# Patient Record
Sex: MALE | Race: WHITE | NOT HISPANIC OR LATINO | Employment: OTHER | URBAN - METROPOLITAN AREA
[De-identification: names, ages, dates, MRNs, and addresses within clinical notes are randomized per-mention and may not be internally consistent; named-entity substitution may affect disease eponyms.]

---

## 2022-12-16 ENCOUNTER — APPOINTMENT (OUTPATIENT)
Dept: GENERAL RADIOLOGY | Facility: HOSPITAL | Age: 47
End: 2022-12-16

## 2022-12-16 ENCOUNTER — HOSPITAL ENCOUNTER (EMERGENCY)
Facility: HOSPITAL | Age: 47
Discharge: HOME | End: 2022-12-16
Attending: EMERGENCY MEDICINE | Admitting: EMERGENCY MEDICINE

## 2022-12-16 VITALS
BODY MASS INDEX: 33.01 KG/M2 | WEIGHT: 217.81 LBS | RESPIRATION RATE: 17 BRPM | DIASTOLIC BLOOD PRESSURE: 82 MMHG | HEART RATE: 50 BPM | SYSTOLIC BLOOD PRESSURE: 148 MMHG | OXYGEN SATURATION: 97 % | HEIGHT: 68 IN | TEMPERATURE: 98.2 F

## 2022-12-16 DIAGNOSIS — R07.9 CHEST PAIN, UNSPECIFIED TYPE: Primary | ICD-10-CM

## 2022-12-16 LAB
ALBUMIN SERPL-MCNC: 4.4 G/DL (ref 3.5–5.2)
ALBUMIN/GLOB SERPL: 1.7 G/DL
ALP SERPL-CCNC: 90 U/L (ref 39–117)
ALT SERPL W P-5'-P-CCNC: 15 U/L (ref 1–41)
ANION GAP SERPL CALCULATED.3IONS-SCNC: 7.3 MMOL/L (ref 5–15)
AST SERPL-CCNC: 15 U/L (ref 1–40)
BASOPHILS # BLD AUTO: 0.03 10*3/MM3 (ref 0–0.2)
BASOPHILS NFR BLD AUTO: 0.7 % (ref 0–1.5)
BILIRUB SERPL-MCNC: 0.9 MG/DL (ref 0–1.2)
BUN SERPL-MCNC: 8 MG/DL (ref 6–20)
BUN/CREAT SERPL: 11 (ref 7–25)
CALCIUM SPEC-SCNC: 9.5 MG/DL (ref 8.6–10.5)
CHLORIDE SERPL-SCNC: 104 MMOL/L (ref 98–107)
CO2 SERPL-SCNC: 25.7 MMOL/L (ref 22–29)
CREAT SERPL-MCNC: 0.73 MG/DL (ref 0.76–1.27)
DEPRECATED RDW RBC AUTO: 39.3 FL (ref 37–54)
EGFRCR SERPLBLD CKD-EPI 2021: 112.2 ML/MIN/1.73
EOSINOPHIL # BLD AUTO: 0.09 10*3/MM3 (ref 0–0.4)
EOSINOPHIL NFR BLD AUTO: 2.1 % (ref 0.3–6.2)
ERYTHROCYTE [DISTWIDTH] IN BLOOD BY AUTOMATED COUNT: 12.1 % (ref 12.3–15.4)
GLOBULIN UR ELPH-MCNC: 2.6 GM/DL
GLUCOSE SERPL-MCNC: 103 MG/DL (ref 65–99)
HCT VFR BLD AUTO: 39.9 % (ref 37.5–51)
HGB BLD-MCNC: 13.7 G/DL (ref 13–17.7)
HOLD SPECIMEN: NORMAL
IMM GRANULOCYTES # BLD AUTO: 0.01 10*3/MM3 (ref 0–0.05)
IMM GRANULOCYTES NFR BLD AUTO: 0.2 % (ref 0–0.5)
LIPASE SERPL-CCNC: 17 U/L (ref 13–60)
LYMPHOCYTES # BLD AUTO: 1.39 10*3/MM3 (ref 0.7–3.1)
LYMPHOCYTES NFR BLD AUTO: 33 % (ref 19.6–45.3)
MAGNESIUM SERPL-MCNC: 2 MG/DL (ref 1.6–2.6)
MCH RBC QN AUTO: 30.4 PG (ref 26.6–33)
MCHC RBC AUTO-ENTMCNC: 34.3 G/DL (ref 31.5–35.7)
MCV RBC AUTO: 88.7 FL (ref 79–97)
MONOCYTES # BLD AUTO: 0.34 10*3/MM3 (ref 0.1–0.9)
MONOCYTES NFR BLD AUTO: 8.1 % (ref 5–12)
NEUTROPHILS NFR BLD AUTO: 2.35 10*3/MM3 (ref 1.7–7)
NEUTROPHILS NFR BLD AUTO: 55.9 % (ref 42.7–76)
NRBC BLD AUTO-RTO: 0 /100 WBC (ref 0–0.2)
NT-PROBNP SERPL-MCNC: 43.6 PG/ML (ref 0–450)
PLATELET # BLD AUTO: 308 10*3/MM3 (ref 140–450)
PMV BLD AUTO: 8.3 FL (ref 6–12)
POTASSIUM SERPL-SCNC: 3.5 MMOL/L (ref 3.5–5.2)
PROT SERPL-MCNC: 7 G/DL (ref 6–8.5)
RBC # BLD AUTO: 4.5 10*6/MM3 (ref 4.14–5.8)
SODIUM SERPL-SCNC: 137 MMOL/L (ref 136–145)
TROPONIN I SERPL-MCNC: 0 NG/ML (ref 0–0.08)
TROPONIN I SERPL-MCNC: 0.01 NG/ML (ref 0–0.08)
WBC NRBC COR # BLD: 4.21 10*3/MM3 (ref 3.4–10.8)
WHOLE BLOOD HOLD COAG: NORMAL
WHOLE BLOOD HOLD SPECIMEN: NORMAL

## 2022-12-16 PROCEDURE — 93010 ELECTROCARDIOGRAM REPORT: CPT | Performed by: INTERNAL MEDICINE

## 2022-12-16 PROCEDURE — 99284 EMERGENCY DEPT VISIT MOD MDM: CPT

## 2022-12-16 PROCEDURE — 83880 ASSAY OF NATRIURETIC PEPTIDE: CPT

## 2022-12-16 PROCEDURE — 93005 ELECTROCARDIOGRAM TRACING: CPT

## 2022-12-16 PROCEDURE — 83735 ASSAY OF MAGNESIUM: CPT | Performed by: EMERGENCY MEDICINE

## 2022-12-16 PROCEDURE — 84484 ASSAY OF TROPONIN QUANT: CPT

## 2022-12-16 PROCEDURE — 36415 COLL VENOUS BLD VENIPUNCTURE: CPT | Performed by: EMERGENCY MEDICINE

## 2022-12-16 PROCEDURE — 93005 ELECTROCARDIOGRAM TRACING: CPT | Performed by: EMERGENCY MEDICINE

## 2022-12-16 PROCEDURE — 85025 COMPLETE CBC W/AUTO DIFF WBC: CPT | Performed by: EMERGENCY MEDICINE

## 2022-12-16 PROCEDURE — 83690 ASSAY OF LIPASE: CPT | Performed by: EMERGENCY MEDICINE

## 2022-12-16 PROCEDURE — 71045 X-RAY EXAM CHEST 1 VIEW: CPT

## 2022-12-16 PROCEDURE — 80053 COMPREHEN METABOLIC PANEL: CPT | Performed by: EMERGENCY MEDICINE

## 2022-12-16 RX ORDER — IBUPROFEN 400 MG/1
800 TABLET ORAL ONCE
Status: COMPLETED | OUTPATIENT
Start: 2022-12-16 | End: 2022-12-16

## 2022-12-16 RX ORDER — ASPIRIN 81 MG/1
324 TABLET, CHEWABLE ORAL ONCE
Status: COMPLETED | OUTPATIENT
Start: 2022-12-16 | End: 2022-12-16

## 2022-12-16 RX ORDER — SODIUM CHLORIDE 0.9 % (FLUSH) 0.9 %
10 SYRINGE (ML) INJECTION AS NEEDED
Status: DISCONTINUED | OUTPATIENT
Start: 2022-12-16 | End: 2022-12-16 | Stop reason: HOSPADM

## 2022-12-16 RX ADMIN — IBUPROFEN 800 MG: 400 TABLET ORAL at 18:40

## 2022-12-16 RX ADMIN — ASPIRIN 324 MG: 81 TABLET, CHEWABLE ORAL at 16:44

## 2022-12-16 NOTE — DISCHARGE INSTRUCTIONS
Recommend follow-up with your primary care provider or Dr. Godwin Bauer, cardiology, to arrange an outpatient cardiac stress test.

## 2022-12-16 NOTE — ED PROVIDER NOTES
"Time: 3:42 PM EST  Chief Complaint:   Chief Complaint   Patient presents with   • Chest Pain           History of Present Illness:  Patient is a 47 y.o. year old male who presents to the emergency department with chest pain. Located left lateral. Waxing and waning. Worse with movement. Started 2 days ago after a certain movement. Described as \"tightness and very uncomfortable\". Father passed at age 38 from MI.           HPI        Patient Care Team  Primary Care Provider: Sedrick Dai MD    Past Medical History:     Allergies   Allergen Reactions   • Jtjka-Bxhva-Fnozxpl-Pramoxine Other (See Comments)     EAR PAIN     Past Medical History:   Diagnosis Date   • Anal cancer (HCC)    • Cancer (HCC)    • Sleep apnea      Past Surgical History:   Procedure Laterality Date   • HEMORRHOIDECTOMY       Family History   Problem Relation Age of Onset   • Heart attack Father        Home Medications:  Prior to Admission medications    Not on File        Social History:   Social History     Tobacco Use   • Smoking status: Never   • Smokeless tobacco: Never   Vaping Use   • Vaping Use: Never used   Substance Use Topics   • Alcohol use: Never   • Drug use: Never         Review of Systems:  Review of Systems   Constitutional: Negative.  Negative for fever.   HENT: Negative.    Eyes: Negative.    Respiratory: Negative.    Cardiovascular: Positive for chest pain.   Gastrointestinal: Negative.    Endocrine: Negative.    Genitourinary: Negative.    Musculoskeletal: Negative.    Skin: Negative.    Allergic/Immunologic: Negative.    Neurological: Negative.    Hematological: Negative.    Psychiatric/Behavioral: Negative.         Physical Exam:  /82   Pulse 53   Temp 98.2 °F (36.8 °C) (Oral)   Resp 17   Ht 172.7 cm (68\")   Wt 98.8 kg (217 lb 13 oz)   SpO2 97%   BMI 33.12 kg/m²     Physical Exam  Vitals and nursing note reviewed.   Constitutional:       Appearance: Normal appearance.   HENT:      Head: Normocephalic. "   Eyes:      Extraocular Movements: Extraocular movements intact.      Conjunctiva/sclera: Conjunctivae normal.   Cardiovascular:      Rate and Rhythm: Normal rate and regular rhythm.      Heart sounds: Normal heart sounds.   Pulmonary:      Effort: Pulmonary effort is normal.      Breath sounds: Normal breath sounds.   Chest:      Chest wall: No tenderness.   Abdominal:      General: Bowel sounds are normal. There is no distension.      Palpations: Abdomen is soft.   Musculoskeletal:         General: Normal range of motion.      Cervical back: Normal range of motion and neck supple.   Skin:     General: Skin is warm and dry.      Coloration: Skin is not cyanotic.   Neurological:      Mental Status: He is alert and oriented to person, place, and time.   Psychiatric:         Attention and Perception: Attention and perception normal.         Mood and Affect: Mood normal.                Medications in the Emergency Department:  Medications   sodium chloride 0.9 % flush 10 mL (has no administration in time range)   aspirin chewable tablet 324 mg (324 mg Oral Given 12/16/22 1644)   ibuprofen (ADVIL,MOTRIN) tablet 800 mg (800 mg Oral Given 12/16/22 1840)        Labs  Lab Results (last 24 hours)     Procedure Component Value Units Date/Time    POC Troponin I with Hold Tube [[id number]] Collected: 12/16/22 1601    Specimen: Blood Updated: 12/16/22 1708    Narrative:      The following orders were created for panel order POC Troponin I with Hold Tube.  Procedure                               Abnormality         Status                     ---------                               -----------         ------                     POC Troponin I[[id number]]                                                              HOLD Troponin-I Tube[[id number]]                             Final result                 Please view results for these tests on the individual orders.    CBC & Differential [[id number]]  (Abnormal) Collected: 12/16/22 1601     Specimen: Blood from Arm, Right Updated: 12/16/22 1615    Narrative:      The following orders were created for panel order CBC & Differential.  Procedure                               Abnormality         Status                     ---------                               -----------         ------                     CBC Auto Differential[[id number]]        Abnormal            Final result                 Please view results for these tests on the individual orders.    Comprehensive Metabolic Panel [[id number]]  (Abnormal) Collected: 12/16/22 1601    Specimen: Blood from Arm, Right Updated: 12/16/22 1639     Glucose 103 mg/dL      BUN 8 mg/dL      Creatinine 0.73 mg/dL      Sodium 137 mmol/L      Potassium 3.5 mmol/L      Chloride 104 mmol/L      CO2 25.7 mmol/L      Calcium 9.5 mg/dL      Total Protein 7.0 g/dL      Albumin 4.40 g/dL      ALT (SGPT) 15 U/L      AST (SGOT) 15 U/L      Alkaline Phosphatase 90 U/L      Total Bilirubin 0.9 mg/dL      Globulin 2.6 gm/dL      A/G Ratio 1.7 g/dL      BUN/Creatinine Ratio 11.0     Anion Gap 7.3 mmol/L      eGFR 112.2 mL/min/1.73      Comment: National Kidney Foundation and American Society of Nephrology (ASN) Task Force recommended calculation based on the Chronic Kidney Disease Epidemiology Collaboration (CKD-EPI) equation refit without adjustment for race.       Narrative:      GFR Normal >60  Chronic Kidney Disease <60  Kidney Failure <15      Lipase [[id number]]  (Normal) Collected: 12/16/22 1601    Specimen: Blood from Arm, Right Updated: 12/16/22 1639     Lipase 17 U/L     BNP [[id number]]  (Normal) Collected: 12/16/22 1601    Specimen: Blood from Arm, Right Updated: 12/16/22 1643     proBNP 43.6 pg/mL     Narrative:      Among patients with dyspnea, NT-proBNP is highly sensitive for the detection of acute congestive heart failure. In addition NT-proBNP of <300 pg/ml effectively rules out acute congestive heart failure with 99% negative predictive value.       Magnesium [[id number]]  (Normal) Collected: 12/16/22 1601    Specimen: Blood from Arm, Right Updated: 12/16/22 1639     Magnesium 2.0 mg/dL     CBC Auto Differential [[id number]]  (Abnormal) Collected: 12/16/22 1601    Specimen: Blood from Arm, Right Updated: 12/16/22 1615     WBC 4.21 10*3/mm3      RBC 4.50 10*6/mm3      Hemoglobin 13.7 g/dL      Hematocrit 39.9 %      MCV 88.7 fL      MCH 30.4 pg      MCHC 34.3 g/dL      RDW 12.1 %      RDW-SD 39.3 fl      MPV 8.3 fL      Platelets 308 10*3/mm3      Neutrophil % 55.9 %      Lymphocyte % 33.0 %      Monocyte % 8.1 %      Eosinophil % 2.1 %      Basophil % 0.7 %      Immature Grans % 0.2 %      Neutrophils, Absolute 2.35 10*3/mm3      Lymphocytes, Absolute 1.39 10*3/mm3      Monocytes, Absolute 0.34 10*3/mm3      Eosinophils, Absolute 0.09 10*3/mm3      Basophils, Absolute 0.03 10*3/mm3      Immature Grans, Absolute 0.01 10*3/mm3      nRBC 0.0 /100 WBC     POC Troponin I [[id number]]  (Normal) Collected: 12/16/22 1604    Specimen: Blood Updated: 12/16/22 1617     Troponin I 0.00 ng/mL      Comment: Serial Number: [id number]:  [id number]       POC Troponin I with Hold Tube [[id number]] Collected: 12/16/22 1805    Specimen: Blood Updated: 12/16/22 1821    Narrative:      The following orders were created for panel order POC Troponin I with Hold Tube.  Procedure                               Abnormality         Status                     ---------                               -----------         ------                     POC Troponin I[[id number]]                                                              HOLD Troponin-I Tube[[id number]]                             Final result                 Please view results for these tests on the individual orders.    POC Troponin I [[id number]]  (Normal) Collected: 12/16/22 1808    Specimen: Blood Updated: 12/16/22 1821     Troponin I 0.01 ng/mL      Comment: Serial Number: [id number]:  [id number]              Imaging:  XR Chest 1  View    Result Date: 12/16/2022  PROCEDURE: XR CHEST 1 VW  COMPARISON: [facility], CR, CHEST AP/PA 1 VIEW, 1/01/2011, 1:16.  INDICATIONS: CHEST PAIN TODAY  FINDINGS:   The lungs are well-expanded. The heart and pulmonary vasculature are within normal limits. No pleural effusions are identified. There are no active appearing infiltrates.  No evidence of pneumothorax.  IMPRESSION: No active disease.  VALENTINO AMARAL MD       Electronically Signed and Approved By: VALENTINO AMARAL MD on 12/16/2022 at 16:56               Procedures:  Procedures    Progress  ED Course as of 12/16/22 1902   Fri Dec 16, 2022   1551 EKG:    Rhythm: Sinus bradycardia  Rate: 54  Intervals: Normal  T-wave: Normal  ST Segment: Normal    EKG Comparison: Not available    Interpreted by me   [NL]      ED Course User Index  [NL] John Veras DO                            The patient was initially evaluated in the triage area where orders were placed. The patient was later dispositioned by John Veras DO.      The patient was advised to stay for completion of workup which includes but is not limited to communication of labs and radiological results, reassessment and plan. The patient was advised that leaving prior to disposition by a provider could result in critical findings that are not communicated to the patient.     Medical Decision Making:  MDM         The following orders were placed after triage and evaluation:  Orders Placed This Encounter   Procedures   • XR Chest 1 View   • [place] Draw   • Comprehensive Metabolic Panel   • Lipase   • BNP   • Magnesium   • CBC Auto Differential   • NPO Diet NPO Type: Strict NPO   • Undress & Gown   • Cardiac Monitoring   • Continuous Pulse Oximetry   • Oxygen Therapy- Nasal Cannula; 2 LPM; Titrate for SPO2: equal to or greater than, 92%   • POC Troponin I   • POC Troponin I   • POC Troponin I   • POC Troponin I   • ECG 12 Lead ED Triage Standing Order; Chest Pain   • ECG 12 Lead ED Triage  Standing Order; Chest Pain   • Insert Peripheral IV   • POC Troponin I with Hold Tube   • CBC & Differential   • Green Top (Gel)   • Lavender Top   • Gold Top - SST   • Light Blue Top   • HOLD Troponin-I Tube   • HOLD Troponin-I Tube     HEART SCORE  History: Slightly Suspicious (0)  ECG: Normal (0)  Age: 45-64 years old (1)  Risk factors: No Risk Factors (0)  Troponin: normal (0)    This patient's HEART score is 1.    According to the HEART Score Study: Score (Risk of adverse cardiac event in the next 14 days)  Scores 0-3: (0.9-1.7%) In the HEART Score study, these patients were discharged home.  Scores 4-6: (12-16.6%)  In the HEART Score study, these patients were admitted to the hospital.  Scores ?7: (50-65%) In the HEART Score study, these patients were candidates for early invasive measures.   Final disposition is based on individual provider judgement and current clinical situation.    Patient's cardiac work-up is negative with 2 sets of normal enzymes.  His chest x-ray is clear and EKGs are unremarkable.  Patient's pain most likely is musculoskeletal in nature.  I have, however, advised the patient to follow-up with his PCP or cardiology to obtain an outpatient cardiac stress test.        Final diagnoses:   Chest pain, unspecified type          Disposition:  ED Disposition     ED Disposition   Discharge    Condition   Stable    Comment   --             This medical record created using voice recognition software.           John Veras DO  12/16/22 3012

## 2022-12-17 LAB
QT INTERVAL: 402 MS
QT INTERVAL: 408 MS

## 2024-03-03 ENCOUNTER — HOSPITAL ENCOUNTER (EMERGENCY)
Facility: HOSPITAL | Age: 49
Discharge: HOME | End: 2024-03-03
Attending: EMERGENCY MEDICINE | Admitting: EMERGENCY MEDICINE
Payer: COMMERCIAL

## 2024-03-03 ENCOUNTER — APPOINTMENT (OUTPATIENT)
Dept: GENERAL RADIOLOGY | Facility: HOSPITAL | Age: 49
End: 2024-03-03
Payer: COMMERCIAL

## 2024-03-03 VITALS
WEIGHT: 230.6 LBS | BODY MASS INDEX: 34.95 KG/M2 | RESPIRATION RATE: 18 BRPM | TEMPERATURE: 98.2 F | HEIGHT: 68 IN | SYSTOLIC BLOOD PRESSURE: 130 MMHG | HEART RATE: 67 BPM | DIASTOLIC BLOOD PRESSURE: 71 MMHG | OXYGEN SATURATION: 95 %

## 2024-03-03 DIAGNOSIS — M54.41 ACUTE RIGHT-SIDED LOW BACK PAIN WITH RIGHT-SIDED SCIATICA: Primary | ICD-10-CM

## 2024-03-03 DIAGNOSIS — M19.011 ARTHROPATHY OF RIGHT SHOULDER: ICD-10-CM

## 2024-03-03 PROCEDURE — 73030 X-RAY EXAM OF SHOULDER: CPT

## 2024-03-03 PROCEDURE — 96372 THER/PROPH/DIAG INJ SC/IM: CPT

## 2024-03-03 PROCEDURE — 99282 EMERGENCY DEPT VISIT SF MDM: CPT

## 2024-03-03 PROCEDURE — 25010000002 KETOROLAC TROMETHAMINE PER 15 MG: Performed by: NURSE PRACTITIONER

## 2024-03-03 PROCEDURE — 72100 X-RAY EXAM L-S SPINE 2/3 VWS: CPT

## 2024-03-03 RX ORDER — LIDOCAINE 50 MG/G
1 PATCH TOPICAL EVERY 24 HOURS
Qty: 5 PATCH | Refills: 0 | Status: SHIPPED | OUTPATIENT
Start: 2024-03-03

## 2024-03-03 RX ORDER — KETOROLAC TROMETHAMINE 30 MG/ML
60 INJECTION, SOLUTION INTRAMUSCULAR; INTRAVENOUS ONCE
Status: COMPLETED | OUTPATIENT
Start: 2024-03-03 | End: 2024-03-03

## 2024-03-03 RX ORDER — METHYLPREDNISOLONE 4 MG/1
TABLET ORAL
Qty: 21 TABLET | Refills: 0 | Status: SHIPPED | OUTPATIENT
Start: 2024-03-03

## 2024-03-03 RX ORDER — IBUPROFEN 800 MG/1
800 TABLET ORAL 3 TIMES DAILY PRN
Qty: 20 TABLET | Refills: 0 | Status: SHIPPED | OUTPATIENT
Start: 2024-03-03

## 2024-03-03 RX ORDER — CYCLOBENZAPRINE HCL 10 MG
10 TABLET ORAL 3 TIMES DAILY PRN
Qty: 12 TABLET | Refills: 0 | Status: SHIPPED | OUTPATIENT
Start: 2024-03-03

## 2024-03-03 RX ADMIN — KETOROLAC TROMETHAMINE 60 MG: 60 INJECTION, SOLUTION INTRAMUSCULAR at 20:34

## 2024-03-03 NOTE — Clinical Note
[facility] EMERGENCY ROOM  [address] [place] [address]  Phone: [phone]  Fax: [phone]    Rinku Laughlin was seen and treated in our emergency department on 3/3/2024.  He may return to work on 03/05/2024.         Thank you for choosing [facility].    Ileana Brenner APRN

## 2024-03-04 NOTE — ED PROVIDER NOTES
Time: 7:04 PM EST  Date of encounter:  3/3/2024  Independent Historian/Clinical History and Information was obtained by:   Patient    History is limited by: N/A    Chief Complaint: Shoulder and back pain      History of Present Illness:  Patient is a 48 y.o. year old male who presents to the emergency department for evaluation of shoulder and back pain.  Patient states about 4 months ago he started having shoulder pain with no known definitive cause.  He had been pushing and pulling a lot of stuff and thought that was just it.  That pain has been on and off but over the last few days has seemed to got worse with no new injury.  In the past month patient noticed that his low back was hurting now in the last few days it has settled into the right side and is shooting down into his right leg to about his calf area.  Aching and throbbing with a shooting intermittent pain.  No numbness tingling or weakness.  No loss of bowel or bladder function.  No dysuria.  No known injury or cause.     HPI    Patient Care Team  Primary Care Provider: Sedrick Dai MD    Past Medical History:     Allergies   Allergen Reactions    Hkxwf-Vjyxs-Wrcyclv-Pramoxine Other (See Comments)     EAR PAIN     Past Medical History:   Diagnosis Date    Anal cancer     Cancer     Sleep apnea      Past Surgical History:   Procedure Laterality Date    HEMORRHOIDECTOMY       Family History   Problem Relation Age of Onset    Heart attack Father        Home Medications:  Prior to Admission medications    Not on File        Social History:   Social History     Tobacco Use    Smoking status: Never    Smokeless tobacco: Never   Vaping Use    Vaping status: Never Used   Substance Use Topics    Alcohol use: Never    Drug use: Never         Review of Systems:  Review of Systems   Constitutional:  Negative for fever.   Respiratory:  Negative for shortness of breath.    Cardiovascular:  Negative for chest pain and leg swelling.   Gastrointestinal:  Negative  "for abdominal pain.   Genitourinary:  Negative for dysuria and flank pain.   Musculoskeletal:  Positive for arthralgias (Right shoulder) and back pain.   Skin:  Negative for color change.   Neurological:  Negative for weakness and numbness.   Hematological: Negative.    Psychiatric/Behavioral: Negative.     All other systems reviewed and are negative.       Physical Exam:  /71 (BP Location: Left arm, Patient Position: Sitting)   Pulse 67   Temp 98.2 °F (36.8 °C) (Oral)   Resp 18   Ht 172.7 cm (68\")   Wt 105 kg (230 lb 9.6 oz)   SpO2 95%   BMI 35.06 kg/m²     Physical Exam  Vitals and nursing note reviewed.   Constitutional:       Appearance: Normal appearance.   HENT:      Head: Atraumatic.   Cardiovascular:      Rate and Rhythm: Normal rate and regular rhythm.      Pulses: Normal pulses.      Heart sounds: Normal heart sounds.   Pulmonary:      Effort: Pulmonary effort is normal.      Breath sounds: Normal breath sounds.   Chest:      Chest wall: No tenderness.   Abdominal:      General: Bowel sounds are normal.      Palpations: Abdomen is soft.      Tenderness: There is no right CVA tenderness or left CVA tenderness.   Musculoskeletal:         General: Tenderness (Tenderness in anterior shoulder with external rotation and abduction) present.      Cervical back: Normal range of motion. No tenderness.      Comments: Tenderness right lower lumbar back    Full range of motion of back and shoulder but complains of pain at a certain point   Skin:     General: Skin is warm and dry.      Capillary Refill: Capillary refill takes less than 2 seconds.   Neurological:      General: No focal deficit present.      Mental Status: He is alert.      Sensory: No sensory deficit.      Motor: No weakness.   Psychiatric:         Mood and Affect: Mood normal.         Behavior: Behavior normal.            Medical Decision Making:      Comorbidities that affect care:    Cancer    External Notes reviewed:    Previous ED Note: " Since last visit was to the ED on December 16, 2022 for chest pain      The following orders were placed and all results were independently analyzed by me:  Orders Placed This Encounter   Procedures    XR Shoulder 2+ View Right    XR Spine Lumbar AP & Lateral       Medications Given in the Emergency Department:  Medications   ketorolac (TORADOL) injection 60 mg (60 mg Intramuscular Given 3/3/24 2034)        ED Course:    ED Course as of 03/03/24 2137   Sun Mar 03, 2024   2028 XR Shoulder 2+ View Right  glenohumeral arthropathy [DS]   2028 XR Spine Lumbar AP & Lateral  Low lumbar degenerative changes [DS]      ED Course User Index  [DS] Ileana Brenner APRN       Labs:    Lab Results (last 24 hours)       ** No results found for the last 24 hours. **             Imaging:    XR Shoulder 2+ View Right    Result Date: 3/3/2024  PROCEDURE: XR SHOULDER 2+ VW RIGHT  COMPARISON: None  INDICATIONS: NO KNOWN INJURY COMPLAINS OF RIGHT SHOULDER PAIN  FINDINGS:  No fracture or dislocation.  No bony erosion or destruction.  No AC joint separation.  There is mild glenohumeral arthropathy.       Mild glenohumeral arthropathy.  No acute findings.      SIRIA WELCH MD       Electronically Signed and Approved By: SIRIA WELCH MD on 3/03/2024 at 20:22             XR Spine Lumbar AP & Lateral    Result Date: 3/3/2024  PROCEDURE: XR SPINE LUMBAR AP AND LATERAL  COMPARISON: None  INDICATIONS: NO KNOWN INJURY COMPLAINS OF LOWER BACK PAIN  FINDINGS:  No fracture or subluxation is seen.  There is mild disc space narrowing at L5-S1.  There is some endplate spurring at L4-5 and L5-S1 as well, and there is lower lumbar facet arthropathy.       Mild lower lumbar degenerative disease.  No fracture or malalignment.      SIRIA WELCH MD       Electronically Signed and Approved By: SIRIA WELCH MD on 3/03/2024 at 20:21                Differential Diagnosis and Discussion:    Back Pain: The patient presents with back pain. My differential  diagnosis includes but is not limited to acute spinal epidural abscess, acute spinal epidural bleed, cauda equina syndrome, abdominal aortic aneurysm, aortic dissection, kidney stone, pyelonephritis, musculoskeletal back pain, spinal fracture, and osteoarthritis.   Extremity Pain: Differential diagnosis includes but is not limited to soft tissue sprain, tendonitis, tendon injury, dislocation, fracture, deep vein thrombosis, arterial insufficiency, osteoarthritis, bursitis, and ligamentous damage.    All X-rays impressions were independently interpreted by me.    MDM  Number of Diagnoses or Management Options  Acute right-sided low back pain with right-sided sciatica  Arthropathy of right shoulder  Diagnosis management comments: The patient´s symptoms are consistent with musculoskeletal back pain. The patient is now resting comfortably, feels better, is alert, talkative, interactive and in no distress. The repeat examination is unremarkable and benign. The patient is neurologically intact and is ambulatory in the ED. The patient has no fever, no bowel or bladder incontinence, no saddle anesthesia, and is otherwise alert and well appearing. The history, physical exam, and diagnostics (if any) do not suggest the presence of acute spinal epidural abscess, acute spinal epidural bleed, cauda equina syndrome, abdominal aortic aneurysm, aortic dissection or other process requiring further testing, treatment or consultation in the emergency department. The vital signs have been stable. The patient's condition is stable and appropriate for discharge. The patient will pursue further outpatient evaluation with the primary care physician or other designated for consulting position as indicated in the discharge instructions.       Amount and/or Complexity of Data Reviewed  Tests in the radiology section of CPT®: reviewed and ordered  Tests in the medicine section of CPT®: ordered and reviewed    Risk of Complications, Morbidity,  and/or Mortality  Presenting problems: low  Diagnostic procedures: low  Management options: low    Patient Progress  Patient progress: stable         Patient Care Considerations:    NARCOTICS: I considered prescribing opiate pain medication as an outpatient, however no acute bony abnormality noted      Consultants/Shared Management Plan:    None    Social Determinants of Health:    Patient is independent, reliable, and has access to care.       Disposition and Care Coordination:    Discharged: The patient is suitable and stable for discharge with no need for consideration of admission.    I have explained the patient´s condition, diagnoses and treatment plan based on the information available to me at this time. I have answered questions and addressed any concerns. The patient has a good  understanding of the patient´s diagnosis, condition, and treatment plan as can be expected at this point. The vital signs have been stable. The patient´s condition is stable and appropriate for discharge from the emergency department.      The patient will pursue further outpatient evaluation with the primary care physician or other designated or consulting physician as outlined in the discharge instructions. They are agreeable to this plan of care and follow-up instructions have been explained in detail. The patient has received these instructions in written format and has expressed an understanding of the discharge instructions. The patient is aware that any significant change in condition or worsening of symptoms should prompt an immediate return to this or the closest emergency department or call to 911.  I have explained discharge medications and the need for follow up with the patient/caretakers. This was also printed in the discharge instructions. Patient was discharged with the following medications and follow up:      Medication List        New Prescriptions      cyclobenzaprine 10 MG tablet  Commonly known as:  FLEXERIL  Take 1 tablet by mouth 3 (Three) Times a Day As Needed for Muscle Spasms.     ibuprofen 800 MG tablet  Commonly known as: ADVIL,MOTRIN  Take 1 tablet by mouth 3 (Three) Times a Day As Needed for Mild Pain.     lidocaine 5 %  Commonly known as: LIDODERM  Place 1 patch on the skin as directed by provider Daily. Remove after 12 hours.     methylPREDNISolone 4 MG dose pack  Commonly known as: MEDROL  Take as directed on package instructions.               Where to Get Your Medications        These medications were sent to [facility] [address] [place] [address] [phone]  - [phone] [address] [address]      Phone: [phone]   cyclobenzaprine 10 MG tablet  ibuprofen 800 MG tablet  lidocaine 5 %  methylPREDNISolone 4 MG dose pack      Sedrick Dai MD  [address] [place] [address] [phone]      As needed       Final diagnoses:   Acute right-sided low back pain with right-sided sciatica   Arthropathy of right shoulder        ED Disposition       ED Disposition   Discharge    Condition   Stable    Comment   --               This medical record created using voice recognition software.             Ileana Brenner, APRN  03/03/24 4527

## 2024-03-04 NOTE — DISCHARGE INSTRUCTIONS
Imaging shows no acute abnormality other than some arthritis in your shoulder and your low back.    Rest.  Alternate ice and moist heat to affected areas.    Take medications as prescribed.    Follow-up with your PCP for persistent symptoms and any additional testing or treatment that may be needed.

## 2024-06-25 ENCOUNTER — HOSPITAL ENCOUNTER (EMERGENCY)
Facility: HOSPITAL | Age: 49
Discharge: HOME | End: 2024-06-25
Attending: EMERGENCY MEDICINE | Admitting: EMERGENCY MEDICINE
Payer: COMMERCIAL

## 2024-06-25 VITALS
TEMPERATURE: 98.1 F | HEART RATE: 59 BPM | WEIGHT: 227.07 LBS | HEIGHT: 68 IN | RESPIRATION RATE: 16 BRPM | DIASTOLIC BLOOD PRESSURE: 74 MMHG | OXYGEN SATURATION: 97 % | BODY MASS INDEX: 34.41 KG/M2 | SYSTOLIC BLOOD PRESSURE: 151 MMHG

## 2024-06-25 DIAGNOSIS — M54.41 ACUTE RIGHT-SIDED LOW BACK PAIN WITH RIGHT-SIDED SCIATICA: Primary | ICD-10-CM

## 2024-06-25 PROCEDURE — 99282 EMERGENCY DEPT VISIT SF MDM: CPT

## 2024-06-25 PROCEDURE — 97110 THERAPEUTIC EXERCISES: CPT | Performed by: PHYSICAL THERAPIST

## 2024-06-25 PROCEDURE — 25010000002 KETOROLAC TROMETHAMINE PER 15 MG

## 2024-06-25 PROCEDURE — 96372 THER/PROPH/DIAG INJ SC/IM: CPT

## 2024-06-25 PROCEDURE — 97161 PT EVAL LOW COMPLEX 20 MIN: CPT | Performed by: PHYSICAL THERAPIST

## 2024-06-25 RX ORDER — LIDOCAINE 50 MG/G
1 PATCH TOPICAL EVERY 24 HOURS
Qty: 7 PATCH | Refills: 0 | Status: SHIPPED | OUTPATIENT
Start: 2024-06-25

## 2024-06-25 RX ORDER — METHYLPREDNISOLONE 4 MG/1
TABLET ORAL
Qty: 21 TABLET | Refills: 0 | Status: SHIPPED | OUTPATIENT
Start: 2024-06-25 | End: 2024-06-30

## 2024-06-25 RX ORDER — KETOROLAC TROMETHAMINE 30 MG/ML
30 INJECTION, SOLUTION INTRAMUSCULAR; INTRAVENOUS ONCE
Status: COMPLETED | OUTPATIENT
Start: 2024-06-25 | End: 2024-06-25

## 2024-06-25 RX ORDER — CYCLOBENZAPRINE HCL 10 MG
10 TABLET ORAL 3 TIMES DAILY PRN
Qty: 20 TABLET | Refills: 0 | Status: SHIPPED | OUTPATIENT
Start: 2024-06-25

## 2024-06-25 RX ORDER — IBUPROFEN 800 MG/1
800 TABLET ORAL EVERY 6 HOURS PRN
Qty: 30 TABLET | Refills: 0 | Status: SHIPPED | OUTPATIENT
Start: 2024-06-25

## 2024-06-25 RX ADMIN — KETOROLAC TROMETHAMINE 30 MG: 30 INJECTION, SOLUTION INTRAMUSCULAR; INTRAVENOUS at 13:51

## 2024-06-25 NOTE — THERAPY EVALUATION
Patient Name: Rinku Laughlin  : 1975    MRN: [id number]                              Today's Date: 2024       Admit Date: 2024    Visit Dx:     ICD-10-CM ICD-9-CM   1. Acute right-sided low back pain with right-sided sciatica  M54.41 724.2     724.3     There is no problem list on file for this patient.    Past Medical History:   Diagnosis Date    Anal cancer     Cancer     Sleep apnea      Past Surgical History:   Procedure Laterality Date    HEMORRHOIDECTOMY        General Information       Row Name 24 1359          Physical Therapy Time and Intention    Document Type evaluation  -LR     Mode of Treatment individual therapy  -LR       Row Name 24 1359          General Information    Patient Profile Reviewed yes  -LR     Prior Level of Function independent:  -LR               User Key  (r) = Recorded By, (t) = Taken By, (c) = Cosigned By      Initials Name Provider Type    LR Angle Saldivar, PT Physical Therapist                  History: Pt reports a few months ago he had a flare up of low back pain and had treatment in the ER which helped his pain.  He reports three days ago his pain got worse again.  He states the pain radiates all the way down the back of his right leg and is constant.  He reports numbness/tingling in his right leg.  Pt reports his sleep is disturbed because of the pain.  Pain is currently a 3/10.  He states his pain is better with movement and worse at rest.    Objective:    Palpation: No tenderness to palpation in lumbar region or R hip    ROM:  Lumbar ROM:  Flexion: WNL  Extension: 75%  L Side bending: WNL  R Side bending: WNL  L Rotation: WNL  R Rotation: WNL and painful    B hip, knee, and ankle ROM WNL  R sciatic nerve tension     Strength:  L Hip MMT:   R Hip MMT:  Flexion: 5/5  Flexion: 5/5  Abduction: 5/5  Abduction: 5/5  Adduction: 5/5  Adduction: 5/5    L Knee MMT:  R Knee MMT:  Flexion: 5/5  Flexion: 5/5  Extension: 5/5  Extension: 5/5    L Ankle MMT:   R Ankle MMT:  DF: 5/5  DF: 5/5  PF: 5/5   PF: 5/5    Special Tests:  Quadrant Test: positive  Slump Test: negative B  Straight Leg Raise Test: negative on L, positive on R  Contralateral Straight Leg Raise Test: negative B  Obers Test: NT     Sensation: B LE sensation intact to light touch    Assessment/Plan:   Pt presents with a diagnosis of low back pain and has signs/symptoms consistent with sciatica with decreased lumbar ROM and R sciatic nerve tension that are limiting his ability to sit and sleep.  The patient was educated in exercises to perform at home to improve pain and ROM.  He was provided with a HEP handout.     Goals:   LTG 1: The patient will be independent in HEP in order to decrease pain and improve tolerance to functional activities.  STATUS: Met    Interventions:   Manual Therapy: not performed    Therapeutic Exercises: HEP: LTR, SKTC, piriformis stretch, sciatic nerve glide, prone press up, piriformis rolling    Modalities: not performed      Outcome Measures       Row Name 06/25/24 1359          Optimal Instrument    Optimal Instrument Optimal - 3  -LR     Sitting 3  -LR     Bending/Stooping 2  -LR     Standing 2  -LR     From the list, choose the 3 activities you would most like to be able to do without any difficulty Bending/stooping;Sitting;Standing  -LR     Total Score Optimal - 3 7  -LR       Row Name 06/25/24 0252          Functional Assessment    Outcome Measure Options Optimal Instrument  -LR               User Key  (r) = Recorded By, (t) = Taken By, (c) = Cosigned By      Initials Name Provider Type    Angle Tesfaye, PT Physical Therapist                     Time Calculation:   PT Evaluation Complexity  History, PT Evaluation Complexity: 1-2 personal factors and/or comorbidities  Examination of Body Systems (PT Eval Complexity): 1-2 elements  Clinical Presentation (PT Evaluation Complexity): stable  Clinical Decision Making (PT Evaluation Complexity): low complexity  Overall  Complexity (PT Evaluation Complexity): low complexity     PT Charges       Row Name 06/25/24 1400             Time Calculation    PT Received On 06/25/24  -LR         Timed Charges    18746 - PT Therapeutic Exercise Minutes 8  -LR         Untimed Charges    PT Eval/Re-eval Minutes 8  -LR         Total Minutes    Timed Charges Total Minutes 8  -LR      Untimed Charges Total Minutes 8  -LR       Total Minutes 16  -LR                User Key  (r) = Recorded By, (t) = Taken By, (c) = Cosigned By      Initials Name Provider Type    LR Angle Saldivar, PT Physical Therapist                  Therapy Charges for Today       Code Description Service Date Service Provider Modifiers Qty    52504159251 HC PT THER PROC EA 15 MIN 6/25/2024 Angle Saldivar, PT GP 1    95968927345 HC PT EVAL LOW COMPLEXITY 1 6/25/2024 Angle Saldivar, PT GP 1            PT G-Codes  Outcome Measure Options: Optimal Instrument       Angle Saldivar, PT  6/25/2024

## 2024-06-25 NOTE — ED PROVIDER NOTES
Time: 1:01 PM EDT  Date of encounter:  6/25/2024  Independent Historian/Clinical History and Information was obtained by:   Patient    History is limited by: N/A    Chief Complaint: Back pain      History of Present Illness:  Patient is a 49 y.o. year old male who presents to the emergency department for evaluation of low back pain for the past 3 days.  Patient reports the pain is primarily in the right lower back, states it radiates down his right leg.  Patient reports similar experience 3 months ago and has been diagnosed with sciatica.  Patient reports mild numbness and tingling in the right upper leg.  Patient denies recent trauma.    HPI    Patient Care Team  Primary Care Provider: Sedrick Dia MD    Past Medical History:     Allergies   Allergen Reactions    Suebr-Apelj-Ubjcxaq-Pramoxine Other (See Comments)     EAR PAIN     Past Medical History:   Diagnosis Date    Anal cancer     Cancer     Sleep apnea      Past Surgical History:   Procedure Laterality Date    HEMORRHOIDECTOMY       Family History   Problem Relation Age of Onset    Heart attack Father        Home Medications:  Prior to Admission medications    Medication Sig Start Date End Date Taking? Authorizing Provider   cyclobenzaprine (FLEXERIL) 10 MG tablet Take 1 tablet by mouth 3 (Three) Times a Day As Needed for Muscle Spasms. 3/3/24   Ileana Brenner APRN   ibuprofen (ADVIL,MOTRIN) 800 MG tablet Take 1 tablet by mouth 3 (Three) Times a Day As Needed for Mild Pain. 3/3/24   Ileana Brenner APRN   lidocaine (LIDODERM) 5 % Place 1 patch on the skin as directed by provider Daily. Remove after 12 hours. 3/3/24   Ileana Brenner APRN   methylPREDNISolone (MEDROL) 4 MG dose pack Take as directed on package instructions. 3/3/24   Ileana Brenner APRN        Social History:   Social History     Tobacco Use    Smoking status: Never    Smokeless tobacco: Never   Vaping Use    Vaping status: Never Used   Substance Use Topics    Alcohol use: Never    Drug use:  "Never         Review of Systems:  Review of Systems   Constitutional:  Negative for chills and fever.   Gastrointestinal:  Negative for abdominal pain, nausea and vomiting.        Negative for bowel incontinence   Genitourinary:  Negative for dysuria, flank pain and hematuria.        Negative for bladder incontinence   Musculoskeletal:  Positive for back pain.   Neurological:  Negative for weakness and numbness.        Negative for saddle anesthesia   All other systems reviewed and are negative.       Physical Exam:  /74 (BP Location: Right arm, Patient Position: Sitting)   Pulse 56   Temp 97.4 °F (36.3 °C) (Oral)   Resp 18   Ht 172.7 cm (68\")   Wt 103 kg (227 lb 1.2 oz)   SpO2 98%   BMI 34.53 kg/m²     Physical Exam  Vitals and nursing note reviewed.   Constitutional:       Appearance: Normal appearance. He is not ill-appearing or toxic-appearing.   HENT:      Head: Normocephalic.      Nose: Nose normal.   Eyes:      Extraocular Movements: Extraocular movements intact.      Conjunctiva/sclera: Conjunctivae normal.      Pupils: Pupils are equal, round, and reactive to light.   Cardiovascular:      Rate and Rhythm: Normal rate.      Pulses: Normal pulses.   Pulmonary:      Effort: Pulmonary effort is normal.   Abdominal:      General: Abdomen is flat. There is no distension.      Palpations: Abdomen is soft.   Musculoskeletal:      Cervical back: Normal range of motion and neck supple.      Lumbar back: Tenderness present. No deformity or bony tenderness. Positive right straight leg raise test.   Skin:     General: Skin is warm and dry.      Capillary Refill: Capillary refill takes less than 2 seconds.   Neurological:      General: No focal deficit present.      Mental Status: He is alert and oriented to person, place, and time.   Psychiatric:         Mood and Affect: Mood normal.            Procedures:  Procedures      Medical Decision Making:      Comorbidities that affect care:    Cancer, " Obesity    External Notes reviewed:    Previous Clinic Note: Primary care office visit from 1/10/2022      The following orders were placed and all results were independently analyzed by me:  Orders Placed This Encounter   Procedures    PT Consult: Eval & Treat Functional Mobility Below Baseline    PT Plan of Care Cert / Re-Cert       Medications Given in the Emergency Department:  Medications   ketorolac (TORADOL) injection 30 mg (30 mg Intramuscular Given 6/25/24 1351)        ED Course:         Labs:    Lab Results (last 24 hours)       ** No results found for the last 24 hours. **             Imaging:    No Radiology Exams Resulted Within Past 24 Hours      Differential Diagnosis and Discussion:    Back Pain: The patient presents with back pain. My differential diagnosis includes but is not limited to acute spinal epidural abscess, acute spinal epidural bleed, cauda equina syndrome, abdominal aortic aneurysm, aortic dissection, kidney stone, pyelonephritis, musculoskeletal back pain, spinal fracture, and osteoarthritis.         MDM     The patient´s symptoms are consistent with musculoskeletal back pain. The patient is now resting comfortably, feels better, is alert, talkative, interactive and in no distress. The repeat examination is unremarkable and benign. The patient is neurologically intact and is ambulatory in the ED. The patient has no fever, no bowel or bladder incontinence, no saddle anesthesia, and is otherwise alert and well appearing. The history, physical exam, and diagnostics (if any) do not suggest the presence of acute spinal epidural abscess, acute spinal epidural bleed, cauda equina syndrome, abdominal aortic aneurysm, aortic dissection or other process requiring further testing, treatment or consultation in the emergency department. The vital signs have been stable. The patient's condition is stable and appropriate for discharge. The patient will pursue further outpatient evaluation with the  primary care physician or other designated for consulting position as indicated in the discharge instructions.          Patient Care Considerations:    MRI: I considered ordering an MRI however no saddle esthesia, no red flags for cauda equina, no neurological deficits.      Consultants/Shared Management Plan:    Consultant: I have discussed the case with Angle Dickerson PT who states she has evaluated the patient and has provided him with therapeutic exercises.    Social Determinants of Health:    Patient is independent, reliable, and has access to care.       Disposition and Care Coordination:    Discharged: The patient is suitable and stable for discharge with no need for consideration of admission.    I have explained the patient´s condition, diagnoses and treatment plan based on the information available to me at this time. I have answered questions and addressed any concerns. The patient has a good  understanding of the patient´s diagnosis, condition, and treatment plan as can be expected at this point. The vital signs have been stable. The patient´s condition is stable and appropriate for discharge from the emergency department.      The patient will pursue further outpatient evaluation with the primary care physician or other designated or consulting physician as outlined in the discharge instructions. They are agreeable to this plan of care and follow-up instructions have been explained in detail. The patient has received these instructions in written format and has expressed an understanding of the discharge instructions. The patient is aware that any significant change in condition or worsening of symptoms should prompt an immediate return to this or the closest emergency department or call to 911.  I have explained discharge medications and the need for follow up with the patient/caretakers. This was also printed in the discharge instructions. Patient was discharged with the following medications and  follow up:      Medication List        Changed      ibuprofen 800 MG tablet  Commonly known as: ADVIL,MOTRIN  Take 1 tablet by mouth Every 6 (Six) Hours As Needed for Moderate Pain.  What changed:   when to take this  reasons to take this     lidocaine 5 %  Commonly known as: LIDODERM  Place 1 patch on the skin as directed by provider Daily. Remove & Discard patch within 12 hours or as directed by MD  What changed: additional instructions     methylPREDNISolone 4 MG dose pack  Commonly known as: MEDROL  Take 6 tablets by mouth Daily for 1 day, THEN 5 tablets Daily for 1 day, THEN 4 tablets Daily for 1 day, THEN 3 tablets Daily for 1 day, THEN 2 tablets Daily for 1 day, THEN 1 tablet Daily for 1 day. Take as directed on package instructions.  Start taking on: June 25, 2024  What changed: See the new instructions.               Where to Get Your Medications        These medications were sent to [facility] [address] [place] [address] [phone] [place] [phone] [place] [id number]      Phone: [phone]   cyclobenzaprine 10 MG tablet  ibuprofen 800 MG tablet  lidocaine 5 %  methylPREDNISolone 4 MG dose pack      Sedrick Dai MD  [address] [place] [address] [address] [place] [id number] [phone]    Call in 2 days         Final diagnoses:   Acute right-sided low back pain with right-sided sciatica        ED Disposition       ED Disposition   Discharge    Condition   Stable    Comment   --               This medical record created using voice recognition software.             Almaz Garcia APRN  06/25/24 8990

## 2024-06-25 NOTE — DISCHARGE INSTRUCTIONS
Avoid any heavy lifting, pushing, or pulling for the next several days.  Try applying ice packs to your sore areas to help with pain and inflammation.  Continue to do the therapeutic exercises that were shown to you by the physical therapist.  Follow-up with your primary care provider.

## 2024-07-19 ENCOUNTER — APPOINTMENT (OUTPATIENT)
Dept: GENERAL RADIOLOGY | Facility: HOSPITAL | Age: 49
End: 2024-07-19
Payer: COMMERCIAL

## 2024-07-19 ENCOUNTER — HOSPITAL ENCOUNTER (EMERGENCY)
Facility: HOSPITAL | Age: 49
Discharge: HOME | End: 2024-07-19
Attending: EMERGENCY MEDICINE
Payer: COMMERCIAL

## 2024-07-19 VITALS
BODY MASS INDEX: 29.1 KG/M2 | DIASTOLIC BLOOD PRESSURE: 81 MMHG | TEMPERATURE: 98.1 F | RESPIRATION RATE: 16 BRPM | HEIGHT: 68 IN | HEART RATE: 63 BPM | OXYGEN SATURATION: 100 % | WEIGHT: 192 LBS | SYSTOLIC BLOOD PRESSURE: 136 MMHG

## 2024-07-19 DIAGNOSIS — M54.41 ACUTE MIDLINE LOW BACK PAIN WITH RIGHT-SIDED SCIATICA: Primary | ICD-10-CM

## 2024-07-19 PROCEDURE — 99283 EMERGENCY DEPT VISIT LOW MDM: CPT

## 2024-07-19 PROCEDURE — 25010000002 KETOROLAC TROMETHAMINE PER 15 MG

## 2024-07-19 PROCEDURE — 25010000002 DEXAMETHASONE SODIUM PHOSPHATE 10 MG/ML SOLUTION

## 2024-07-19 PROCEDURE — 72100 X-RAY EXAM L-S SPINE 2/3 VWS: CPT

## 2024-07-19 PROCEDURE — 96372 THER/PROPH/DIAG INJ SC/IM: CPT

## 2024-07-19 RX ORDER — KETOROLAC TROMETHAMINE 30 MG/ML
60 INJECTION, SOLUTION INTRAMUSCULAR; INTRAVENOUS ONCE
Status: COMPLETED | OUTPATIENT
Start: 2024-07-19 | End: 2024-07-19

## 2024-07-19 RX ORDER — CYCLOBENZAPRINE HCL 10 MG
10 TABLET ORAL ONCE
Status: DISCONTINUED | OUTPATIENT
Start: 2024-07-19 | End: 2024-07-19

## 2024-07-19 RX ORDER — DEXAMETHASONE SODIUM PHOSPHATE 10 MG/ML
10 INJECTION, SOLUTION INTRAMUSCULAR; INTRAVENOUS ONCE
Status: COMPLETED | OUTPATIENT
Start: 2024-07-19 | End: 2024-07-19

## 2024-07-19 RX ORDER — KETOROLAC TROMETHAMINE 10 MG/1
10 TABLET, FILM COATED ORAL EVERY 6 HOURS PRN
Qty: 15 TABLET | Refills: 0 | Status: SHIPPED | OUTPATIENT
Start: 2024-07-19

## 2024-07-19 RX ORDER — METHYLPREDNISOLONE 4 MG/1
TABLET ORAL
Qty: 21 TABLET | Refills: 0 | Status: SHIPPED | OUTPATIENT
Start: 2024-07-19

## 2024-07-19 RX ORDER — CYCLOBENZAPRINE HCL 10 MG
10 TABLET ORAL 3 TIMES DAILY
Qty: 20 TABLET | Refills: 0 | Status: SHIPPED | OUTPATIENT
Start: 2024-07-19

## 2024-07-19 RX ADMIN — KETOROLAC TROMETHAMINE 60 MG: 30 INJECTION, SOLUTION INTRAMUSCULAR at 18:26

## 2024-07-19 RX ADMIN — DEXAMETHASONE SODIUM PHOSPHATE 10 MG: 10 INJECTION INTRAMUSCULAR; INTRAVENOUS at 18:26

## 2024-07-19 NOTE — DISCHARGE INSTRUCTIONS
Please follow with your primary care provider next week if pain persist.  Return to the ED for worsening pain, numbness or tingling of your lower extremities, loss of control of your bowel or bladder.

## 2024-07-19 NOTE — ED PROVIDER NOTES
Time: 6:17 PM EDT  Date of encounter:  7/19/2024  Independent Historian/Clinical History and Information was obtained by:   Patient    History is limited by: N/A    Chief Complaint: Back pain      History of Present Illness:  Patient is a 49 y.o. year old male who presents to the emergency department for evaluation of lumbar back pain ongoing since yesterday.  Patient reports history of similar episodes in the past with episodes being more frequent for the last 6 months.  Denies any new injury.  Reports pain radiation into the right leg but denies any saddle anesthesia or incontinence of bowel or bladder.    HPI    Patient Care Team  Primary Care Provider: Sedrick Dai MD    Past Medical History:     Allergies   Allergen Reactions    Biylq-Lsumb-Gafexzp-Pramoxine Other (See Comments)     EAR PAIN     Past Medical History:   Diagnosis Date    Anal cancer     Cancer     Sleep apnea      Past Surgical History:   Procedure Laterality Date    HEMORRHOIDECTOMY       Family History   Problem Relation Age of Onset    Heart attack Father        Home Medications:  Prior to Admission medications    Medication Sig Start Date End Date Taking? Authorizing Provider   cyclobenzaprine (FLEXERIL) 10 MG tablet Take 1 tablet by mouth 3 (Three) Times a Day As Needed for Muscle Spasms. 6/25/24   Almaz Garcia APRN   ibuprofen (ADVIL,MOTRIN) 800 MG tablet Take 1 tablet by mouth Every 6 (Six) Hours As Needed for Moderate Pain. 6/25/24   Almaz Garcia APRN   lidocaine (LIDODERM) 5 % Place 1 patch on the skin as directed by provider Daily. Remove & Discard patch within 12 hours or as directed by MD 6/25/24   Almaz Garcia APRN        Social History:   Social History     Tobacco Use    Smoking status: Never    Smokeless tobacco: Never   Vaping Use    Vaping status: Never Used   Substance Use Topics    Alcohol use: Never    Drug use: Never         Review of Systems:  Review of Systems   Constitutional:  Negative for chills,  "fatigue and fever.   HENT:  Negative for ear pain, rhinorrhea and sore throat.    Eyes:  Negative for visual disturbance.   Respiratory:  Negative for cough and shortness of breath.    Cardiovascular:  Negative for chest pain.   Gastrointestinal:  Negative for abdominal pain, diarrhea and vomiting.   Genitourinary:  Negative for difficulty urinating.   Musculoskeletal:  Positive for back pain. Negative for arthralgias and myalgias.   Skin:  Negative for rash.   Neurological:  Negative for light-headedness and headaches.   Hematological:  Negative for adenopathy.   Psychiatric/Behavioral: Negative.          Physical Exam:  /81   Pulse 63   Temp 98.1 °F (36.7 °C) (Oral)   Resp 16   Ht 172.7 cm (68\")   Wt 87.1 kg (192 lb)   SpO2 100%   BMI 29.19 kg/m²     Physical Exam  Vitals and nursing note reviewed.   Constitutional:       General: He is not in acute distress.     Appearance: Normal appearance. He is not toxic-appearing.   HENT:      Head: Normocephalic and atraumatic.      Nose: Nose normal.      Mouth/Throat:      Mouth: Mucous membranes are moist.   Eyes:      Conjunctiva/sclera: Conjunctivae normal.   Cardiovascular:      Rate and Rhythm: Normal rate.      Pulses: Normal pulses.   Pulmonary:      Effort: Pulmonary effort is normal.   Musculoskeletal:         General: No tenderness. Normal range of motion.      Cervical back: Normal range of motion.   Skin:     General: Skin is warm and dry.   Neurological:      General: No focal deficit present.      Mental Status: He is alert and oriented to person, place, and time.   Psychiatric:         Mood and Affect: Mood normal.         Behavior: Behavior normal.         Thought Content: Thought content normal.         Judgment: Judgment normal.                  Procedures:  Procedures      Medical Decision Making:      Comorbidities that affect care:    Cancer    External Notes reviewed:    None      The following orders were placed and all results were " independently analyzed by me:  Orders Placed This Encounter   Procedures    XR Spine Lumbar 2 or 3 View       Medications Given in the Emergency Department:  Medications   ketorolac (TORADOL) injection 60 mg (60 mg Intramuscular Given 7/19/24 1826)   dexAMETHasone sodium phosphate injection 10 mg (10 mg Intramuscular Given 7/19/24 1826)        ED Course:         Labs:    Lab Results (last 24 hours)       ** No results found for the last 24 hours. **             Imaging:    XR Spine Lumbar 2 or 3 View    Result Date: 7/19/2024  XR SPINE LUMBAR 2 OR 3 VW Date of Exam: 7/19/2024 5:26 PM EDT Indication: pain Comparison: l spine xray 3/3/24. Findings:   Small osteophytes without disc height loss or malalignment.  Mild facet degenerative change lower lumbar spine.     Impression:   No acute findings. Electronically Signed: Ingrid Paris MD  7/19/2024 6:08 PM EDT  Workstation ID: [id number]       Differential Diagnosis and Discussion:    Back Pain: The patient presents with back pain. My differential diagnosis includes but is not limited to acute spinal epidural abscess, acute spinal epidural bleed, cauda equina syndrome, abdominal aortic aneurysm, aortic dissection, kidney stone, pyelonephritis, musculoskeletal back pain, spinal fracture, and osteoarthritis.     All X-rays impressions were independently interpreted by me.    MDM  Number of Diagnoses or Management Options  Acute midline low back pain with right-sided sciatica  Diagnosis management comments: The patient´s symptoms are consistent with musculoskeletal back pain. The patient is now resting comfortably, feels better, is alert, talkative, interactive and in no distress. The repeat examination is unremarkable and benign. The patient is neurologically intact and is ambulatory in the ED. The patient has no fever, no bowel or bladder incontinence, no saddle anesthesia, and is otherwise alert and well appearing. The history, physical exam, and diagnostics (if any) do not  suggest the presence of acute spinal epidural abscess, acute spinal epidural bleed, cauda equina syndrome, abdominal aortic aneurysm, aortic dissection or other process requiring further testing, treatment or consultation in the emergency department. The vital signs have been stable. The patient's condition is stable and appropriate for discharge. The patient will pursue further outpatient evaluation with the primary care physician or other designated for consulting position as indicated in the discharge instructions.                Patient Care Considerations:    NARCOTICS: I considered prescribing opiate pain medication as an outpatient, however pain is manageable without the use of narcotics in the ED.      Consultants/Shared Management Plan:    None    Social Determinants of Health:    Patient is independent, reliable, and has access to care.       Disposition and Care Coordination:    Discharged: The patient is suitable and stable for discharge with no need for consideration of admission.    I have explained the patient´s condition, diagnoses and treatment plan based on the information available to me at this time. I have answered questions and addressed any concerns. The patient has a good  understanding of the patient´s diagnosis, condition, and treatment plan as can be expected at this point. The vital signs have been stable. The patient´s condition is stable and appropriate for discharge from the emergency department.      The patient will pursue further outpatient evaluation with the primary care physician or other designated or consulting physician as outlined in the discharge instructions. They are agreeable to this plan of care and follow-up instructions have been explained in detail. The patient has received these instructions in written format and has expressed an understanding of the discharge instructions. The patient is aware that any significant change in condition or worsening of symptoms should  prompt an immediate return to this or the closest emergency department or call to 1.  I have explained discharge medications and the need for follow up with the patient/caretakers. This was also printed in the discharge instructions. Patient was discharged with the following medications and follow up:      Medication List        New Prescriptions      ketorolac 10 MG tablet  Commonly known as: TORADOL  Take 1 tablet by mouth Every 6 (Six) Hours As Needed for Moderate Pain.     methylPREDNISolone 4 MG dose pack  Commonly known as: MEDROL  Take as directed on package instructions.            Changed      cyclobenzaprine 10 MG tablet  Commonly known as: FLEXERIL  Take 1 tablet by mouth 3 (Three) Times a Day.  What changed:   when to take this  reasons to take this               Where to Get Your Medications        These medications were sent to [facility] [address] [place] [address] [phone] [facility] [phone] [address] [address]      Phone: [phone]   cyclobenzaprine 10 MG tablet  ketorolac 10 MG tablet  methylPREDNISolone 4 MG dose pack      Sedrick Dai MD  [address] [place] [address] [address] [place] [address] [phone]    Go to   As needed       Final diagnoses:   Acute midline low back pain with right-sided sciatica        ED Disposition       ED Disposition   Discharge    Condition   Stable    Comment   --               This medical record created using voice recognition software.             Prerna Ash, APRN  07/19/24 3624